# Patient Record
Sex: MALE | Race: WHITE | NOT HISPANIC OR LATINO | Employment: FULL TIME | URBAN - METROPOLITAN AREA
[De-identification: names, ages, dates, MRNs, and addresses within clinical notes are randomized per-mention and may not be internally consistent; named-entity substitution may affect disease eponyms.]

---

## 2024-03-08 ENCOUNTER — PATIENT ROUNDING (BHMG ONLY) (OUTPATIENT)
Dept: FAMILY MEDICINE CLINIC | Facility: CLINIC | Age: 29
End: 2024-03-08
Payer: COMMERCIAL

## 2024-03-08 ENCOUNTER — OFFICE VISIT (OUTPATIENT)
Dept: FAMILY MEDICINE CLINIC | Facility: CLINIC | Age: 29
End: 2024-03-08
Payer: COMMERCIAL

## 2024-03-08 VITALS
DIASTOLIC BLOOD PRESSURE: 80 MMHG | HEIGHT: 69 IN | SYSTOLIC BLOOD PRESSURE: 122 MMHG | HEART RATE: 79 BPM | TEMPERATURE: 97.8 F | OXYGEN SATURATION: 97 % | RESPIRATION RATE: 17 BRPM

## 2024-03-08 DIAGNOSIS — R33.9 INCOMPLETE BLADDER EMPTYING: Primary | ICD-10-CM

## 2024-03-08 DIAGNOSIS — Z00.00 ANNUAL PHYSICAL EXAM: ICD-10-CM

## 2024-03-08 DIAGNOSIS — Z12.5 SCREENING FOR PROSTATE CANCER: Primary | ICD-10-CM

## 2024-03-08 DIAGNOSIS — R51.9 FREQUENT HEADACHES: ICD-10-CM

## 2024-03-08 NOTE — PROGRESS NOTES
A My-Chart message has been sent to the patient for PATIENT ROUNDING with [facility] [place]

## 2024-03-08 NOTE — PROGRESS NOTES
"Chief Complaint  Chief Complaint   Patient presents with    [facility]     New Pt     Headache     Pt c/o on/off  headaches x 1.5 years     Breast Cancer     Pt mother was just dx with breast cancer and wants to discuss preventative treatment plans     urinary problem      Pt c/o leaking after urinating        Subjective    History of Present Illness        Colby Luna presents to [facility] for   History of Present Illness  Patient is a 28-year-old male is being seen in the clinic for multiple medical problems.  Patient is having problems with urine dribbling after going to the bathroom.  He does not completely empty his bladder despite sitting or standing while urinating.  He reports that he typically has to squat afterwards in order to completely empty his bladder.    He also has complaints of headaches that he has been having intermittently for the last year and a half.  He typically takes over-the-counter medications to treat these headaches.  He wanted to discuss treatment options for his headaches.      Objective   Vital Signs:   Visit Vitals  /80   Pulse 79   Temp 97.8 °F (36.6 °C)   Resp 17   Ht 175.3 cm (69\")   SpO2 97%          BMI cannot be calculated due to outdated height or weight values.  Please input a current height/weight in Vitals and re-renter [id number] in Note to pull in correct documentation based on BMI range.     Physical Exam  Vitals reviewed.   Constitutional:       Appearance: He is well-developed.   HENT:      Head: Normocephalic.      Right Ear: External ear normal.      Left Ear: External ear normal.      Nose: Nose normal.   Eyes:      Conjunctiva/sclera: Conjunctivae normal.   Cardiovascular:      Rate and Rhythm: Normal rate and regular rhythm.   Pulmonary:      Effort: Pulmonary effort is normal.      Breath sounds: Normal breath sounds.   Musculoskeletal:         General: Normal range of motion.      Cervical back: Normal range of motion " and neck supple.   Skin:     General: Skin is warm and dry.      Capillary Refill: Capillary refill takes less than 2 seconds.   Neurological:      Mental Status: He is alert and oriented to person, place, and time.            Result Review :                    Assessment and Plan      Diagnoses and all orders for this visit:    1. Incomplete bladder emptying (Primary)  Assessment & Plan:  Unknown etiology of symptoms.  Patient was advised to consider urology referral to help treat his symptoms.  Patient was encouraged to return to clinic if his symptoms worsen.      2. Frequent headaches  Assessment & Plan:  His headaches appear to be mild treated with NSAIDs.  Patient was encouraged to continue using NSAIDs at this time.  Patient was encouraged to return to clinic if her symptoms do not improve.           I spent 20 minutes caring for Colby on this date of service. This time includes time spent by me in the following activities:preparing for the visit, performing a medically appropriate examination and/or evaluation , counseling and educating the patient/family/caregiver, documenting information in the medical record, and care coordination    Follow Up   No follow-ups on file.  Patient was given instructions and counseling regarding his condition or for health maintenance advice. Please see specific information pulled into the AVS if appropriate.

## 2024-03-22 PROBLEM — R33.9 INCOMPLETE BLADDER EMPTYING: Status: ACTIVE | Noted: 2024-03-22

## 2024-03-22 PROBLEM — R51.9 FREQUENT HEADACHES: Status: ACTIVE | Noted: 2024-03-22

## 2024-03-22 NOTE — ASSESSMENT & PLAN NOTE
Unknown etiology of symptoms.  Patient was advised to consider urology referral to help treat his symptoms.  Patient was encouraged to return to clinic if his symptoms worsen.

## 2024-03-22 NOTE — ASSESSMENT & PLAN NOTE
His headaches appear to be mild treated with NSAIDs.  Patient was encouraged to continue using NSAIDs at this time.  Patient was encouraged to return to clinic if her symptoms do not improve.

## 2024-04-01 ENCOUNTER — TELEPHONE (OUTPATIENT)
Dept: FAMILY MEDICINE CLINIC | Facility: CLINIC | Age: 29
End: 2024-04-01

## 2024-04-01 NOTE — TELEPHONE ENCOUNTER
Hub staff attempted to follow warm transfer process and was unsuccessful     Caller: Colby Luna    Relationship to patient: Self    Best call back number: [phone]     Patient is needing: NEEDS TO RESCHEDULE LAB APPOINTMENT

## 2024-04-18 ENCOUNTER — TELEPHONE (OUTPATIENT)
Dept: FAMILY MEDICINE CLINIC | Facility: CLINIC | Age: 29
End: 2024-04-18
Payer: COMMERCIAL

## 2024-04-18 NOTE — TELEPHONE ENCOUNTER
Called pt and left VM to reschedule appt Dr. Griffin will be out of the office on 4/26/24    Ok for hub to relay and schedule

## 2024-04-24 ENCOUNTER — TELEPHONE (OUTPATIENT)
Dept: FAMILY MEDICINE CLINIC | Facility: CLINIC | Age: 29
End: 2024-04-24

## 2024-04-24 NOTE — TELEPHONE ENCOUNTER
Hub staff attempted to follow warm transfer process and was unsuccessful     Caller: Colby Luna    Relationship to patient: Self    Best call back number: [phone]    Patient is needing: PATIENT CALLING TO SCHEDULE LABS PRIOR TO VISIT ON 05/30/24.    PLEASE CALL.

## 2024-06-10 ENCOUNTER — TELEPHONE (OUTPATIENT)
Dept: FAMILY MEDICINE CLINIC | Facility: CLINIC | Age: 29
End: 2024-06-10

## 2024-06-10 ENCOUNTER — OFFICE VISIT (OUTPATIENT)
Dept: FAMILY MEDICINE CLINIC | Facility: CLINIC | Age: 29
End: 2024-06-10
Payer: COMMERCIAL

## 2024-06-10 VITALS
DIASTOLIC BLOOD PRESSURE: 70 MMHG | OXYGEN SATURATION: 99 % | BODY MASS INDEX: 23.65 KG/M2 | SYSTOLIC BLOOD PRESSURE: 111 MMHG | TEMPERATURE: 97.9 F | HEIGHT: 69 IN | HEART RATE: 52 BPM | WEIGHT: 159.7 LBS

## 2024-06-10 DIAGNOSIS — Z00.00 ANNUAL PHYSICAL EXAM: Primary | ICD-10-CM

## 2024-06-10 PROCEDURE — 99395 PREV VISIT EST AGE 18-39: CPT | Performed by: FAMILY MEDICINE

## 2024-06-10 NOTE — PROGRESS NOTES
"Chief Complaint  Chief Complaint   Patient presents with    Annual Exam     Physical Exam        Subjective    History of Present Illness        Colby Luna presents to [facility] for   History of Present Illness  Colby Luna is a 29 y.o. male here for his annual physical with me. Colby is here for coordination of medical care, to discuss health maintenance, disease prevention as well as to followup on medical problems. Patient has been followed by me since 2024. Patient's last CPE was 2023. Activity level is moderate. Exercises 4 per week. Appetite is good. Feels well with none complaints. Energy level is fair. Sleeps fairly well.      History of Present Illness      Objective   Vital Signs:   Visit Vitals  /70 (BP Location: Left arm, Patient Position: Sitting, Cuff Size: Adult)   Pulse 52   Temp 97.9 °F (36.6 °C)   Ht 175.3 cm (69\")   Wt 72.4 kg (159 lb 11.2 oz)   SpO2 99%   BMI 23.58 kg/m²          BMI is within normal parameters. No other follow-up for BMI required.     Physical Exam  Vitals reviewed.   Constitutional:       Appearance: He is well-developed.   HENT:      Head: Normocephalic and atraumatic.      Nose: Nose normal.   Eyes:      General: Lids are normal.      Conjunctiva/sclera: Conjunctivae normal.      Pupils: Pupils are equal, round, and reactive to light.   Cardiovascular:      Rate and Rhythm: Normal rate and regular rhythm.      Pulses: Normal pulses.      Heart sounds: Normal heart sounds.   Pulmonary:      Effort: Pulmonary effort is normal. No respiratory distress.      Breath sounds: Normal breath sounds.   Abdominal:      General: Bowel sounds are normal.      Palpations: Abdomen is soft.   Musculoskeletal:         General: Normal range of motion.      Cervical back: Normal range of motion and neck supple.   Skin:     General: Skin is warm and dry.      Capillary Refill: Capillary refill takes less than 2 seconds.   Neurological:      Mental " Status: He is alert and oriented to person, place, and time.   Psychiatric:         Behavior: Behavior normal.         Thought Content: Thought content normal.         Judgment: Judgment normal.        Physical Exam           Result Review :  Results                            Assessment and Plan      Diagnoses and all orders for this visit:    1. Annual physical exam (Primary)  Assessment & Plan:  Discussed injury prevention, diet and exercise, safe sexual practices, and screening for common diseases. Encouraged use of sunscreen and seatbelts. Avoidance of tobacco encouraged. Limitation or avoidance of alcohol encouraged. Recommend yearly dental and eye exams. Also discussed monitoring of blood pressure, lipids.         Assessment & Plan             Follow Up   No follow-ups on file.  Patient was given instructions and counseling regarding his condition or for health maintenance advice. Please see specific information pulled into the AVS if appropriate.

## 2024-06-12 PROBLEM — Z00.00 ANNUAL PHYSICAL EXAM: Status: ACTIVE | Noted: 2024-06-12
